# Patient Record
Sex: MALE | Race: WHITE | NOT HISPANIC OR LATINO | URBAN - METROPOLITAN AREA
[De-identification: names, ages, dates, MRNs, and addresses within clinical notes are randomized per-mention and may not be internally consistent; named-entity substitution may affect disease eponyms.]

---

## 2022-09-20 ENCOUNTER — APPOINTMENT (RX ONLY)
Dept: URBAN - METROPOLITAN AREA CLINIC 187 | Facility: CLINIC | Age: 52
Setting detail: DERMATOLOGY
End: 2022-09-20

## 2022-09-20 DIAGNOSIS — L81.4 OTHER MELANIN HYPERPIGMENTATION: ICD-10-CM | Status: UNCHANGED

## 2022-09-20 DIAGNOSIS — L663 OTHER SPECIFIED DISEASES OF HAIR AND HAIR FOLLICLES: ICD-10-CM

## 2022-09-20 DIAGNOSIS — L85.3 XEROSIS CUTIS: ICD-10-CM

## 2022-09-20 DIAGNOSIS — L82.1 OTHER SEBORRHEIC KERATOSIS: ICD-10-CM | Status: UNCHANGED

## 2022-09-20 DIAGNOSIS — D49.2 NEOPLASM OF UNSPECIFIED BEHAVIOR OF BONE, SOFT TISSUE, AND SKIN: ICD-10-CM | Status: UNCHANGED

## 2022-09-20 DIAGNOSIS — L738 OTHER SPECIFIED DISEASES OF HAIR AND HAIR FOLLICLES: ICD-10-CM

## 2022-09-20 DIAGNOSIS — B86 SCABIES: ICD-10-CM

## 2022-09-20 DIAGNOSIS — L73.9 FOLLICULAR DISORDER, UNSPECIFIED: ICD-10-CM

## 2022-09-20 DIAGNOSIS — L57.8 OTHER SKIN CHANGES DUE TO CHRONIC EXPOSURE TO NONIONIZING RADIATION: ICD-10-CM | Status: INADEQUATELY CONTROLLED

## 2022-09-20 DIAGNOSIS — D18.0 HEMANGIOMA: ICD-10-CM | Status: UNCHANGED

## 2022-09-20 PROBLEM — L02.821 FURUNCLE OF HEAD [ANY PART, EXCEPT FACE]: Status: ACTIVE | Noted: 2022-09-20

## 2022-09-20 PROBLEM — D18.01 HEMANGIOMA OF SKIN AND SUBCUTANEOUS TISSUE: Status: ACTIVE | Noted: 2022-09-20

## 2022-09-20 PROCEDURE — ? ADDITIONAL NOTES

## 2022-09-20 PROCEDURE — ? EDUCATIONAL RESOURCES PROVIDED

## 2022-09-20 PROCEDURE — 11102 TANGNTL BX SKIN SINGLE LES: CPT

## 2022-09-20 PROCEDURE — ? BIOPSY BY SHAVE METHOD

## 2022-09-20 PROCEDURE — ? COUNSELING

## 2022-09-20 PROCEDURE — ? SUNSCREEN RECOMMENDATIONS

## 2022-09-20 PROCEDURE — ? PRESCRIPTION

## 2022-09-20 PROCEDURE — 99214 OFFICE O/P EST MOD 30 MIN: CPT | Mod: 25

## 2022-09-20 PROCEDURE — ? OTC TREATMENT REGIMEN

## 2022-09-20 RX ORDER — IVERMECTIN 3 MG/1
TABLET ORAL
Qty: 8 | Refills: 0 | Status: ERX | COMMUNITY
Start: 2022-09-20

## 2022-09-20 RX ORDER — CLINDAMYCIN PHOSPHATE 10 MG/ML
SOLUTION TOPICAL
Qty: 60 | Refills: 1 | Status: ERX | COMMUNITY
Start: 2022-09-20

## 2022-09-20 RX ORDER — TRIAMCINOLONE ACETONIDE 1 MG/G
CREAM TOPICAL
Qty: 453.6 | Refills: 0 | Status: ERX | COMMUNITY
Start: 2022-09-20

## 2022-09-20 RX ADMIN — CLINDAMYCIN PHOSPHATE 1: 10 SOLUTION TOPICAL at 00:00

## 2022-09-20 RX ADMIN — IVERMECTIN 1: 3 TABLET ORAL at 00:00

## 2022-09-20 RX ADMIN — TRIAMCINOLONE ACETONIDE 1: 1 CREAM TOPICAL at 00:00

## 2022-09-20 ASSESSMENT — LOCATION DETAILED DESCRIPTION DERM
LOCATION DETAILED: STERNAL NOTCH
LOCATION DETAILED: RIGHT SUPERIOR UPPER BACK
LOCATION DETAILED: LEFT ANTERIOR DISTAL THIGH
LOCATION DETAILED: RIGHT DISTAL PRETIBIAL REGION
LOCATION DETAILED: RIGHT ANTERIOR PROXIMAL THIGH
LOCATION DETAILED: RIGHT MID-UPPER BACK
LOCATION DETAILED: LEFT MEDIAL UPPER BACK
LOCATION DETAILED: LEFT SUPERIOR PARIETAL SCALP
LOCATION DETAILED: RIGHT ANTERIOR DISTAL THIGH
LOCATION DETAILED: LEFT PROXIMAL POSTERIOR UPPER ARM
LOCATION DETAILED: RIGHT SUPERIOR OCCIPITAL SCALP
LOCATION DETAILED: RIGHT PROXIMAL PRETIBIAL REGION
LOCATION DETAILED: LEFT CENTRAL FRONTAL SCALP
LOCATION DETAILED: RIGHT PROXIMAL CALF
LOCATION DETAILED: LEFT SUPERIOR MEDIAL UPPER BACK
LOCATION DETAILED: RIGHT MEDIAL SUPERIOR CHEST
LOCATION DETAILED: LEFT DISTAL CALF
LOCATION DETAILED: RIGHT ELBOW
LOCATION DETAILED: RIGHT OCCIPITAL SCALP
LOCATION DETAILED: LEFT PROXIMAL PRETIBIAL REGION
LOCATION DETAILED: RIGHT POPLITEAL SKIN
LOCATION DETAILED: LEFT VENTRAL DISTAL FOREARM
LOCATION DETAILED: LEFT SUPRAPUBIC REGION
LOCATION DETAILED: RIGHT VENTRAL PROXIMAL FOREARM
LOCATION DETAILED: LEFT ANTERIOR SHOULDER
LOCATION DETAILED: RIGHT DORSAL WRIST
LOCATION DETAILED: LEFT SUPERIOR POSTAURICULAR SKIN
LOCATION DETAILED: LEFT DISTAL POSTERIOR UPPER ARM
LOCATION DETAILED: LEFT POPLITEAL SKIN
LOCATION DETAILED: RIGHT PROXIMAL POSTERIOR UPPER ARM
LOCATION DETAILED: RIGHT POSTERIOR SHOULDER
LOCATION DETAILED: LEFT ANTERIOR PROXIMAL UPPER ARM
LOCATION DETAILED: RIGHT DISTAL DORSAL FOREARM
LOCATION DETAILED: LEFT SUPERIOR OCCIPITAL SCALP
LOCATION DETAILED: LEFT PROXIMAL CALF
LOCATION DETAILED: LEFT INFERIOR OCCIPITAL SCALP
LOCATION DETAILED: RIGHT CENTRAL FRONTAL SCALP
LOCATION DETAILED: LEFT DISTAL PRETIBIAL REGION
LOCATION DETAILED: RIGHT VENTRAL DISTAL FOREARM
LOCATION DETAILED: RIGHT KNEE
LOCATION DETAILED: RIGHT PROXIMAL DORSAL FOREARM
LOCATION DETAILED: LEFT SUPERIOR LATERAL LOWER BACK
LOCATION DETAILED: RIGHT LATERAL ABDOMEN
LOCATION DETAILED: LEFT SUPERIOR LATERAL UPPER BACK
LOCATION DETAILED: LEFT ANTERIOR PROXIMAL THIGH
LOCATION DETAILED: PERIUMBILICAL SKIN
LOCATION DETAILED: LEFT LATERAL ABDOMEN
LOCATION DETAILED: LEFT SUPERIOR UPPER BACK
LOCATION DETAILED: RIGHT SUPERIOR MEDIAL LOWER BACK
LOCATION DETAILED: LEFT DISTAL DORSAL FOREARM
LOCATION DETAILED: RIGHT INFERIOR OCCIPITAL SCALP
LOCATION DETAILED: LEFT SUPERIOR MEDIAL MIDBACK
LOCATION DETAILED: LEFT OCCIPITAL SCALP
LOCATION DETAILED: EPIGASTRIC SKIN
LOCATION DETAILED: LEFT PROXIMAL DORSAL FOREARM
LOCATION DETAILED: LEFT MID-UPPER BACK
LOCATION DETAILED: LEFT POSTERIOR SHOULDER
LOCATION DETAILED: RIGHT ANTERIOR SHOULDER
LOCATION DETAILED: RIGHT CENTRAL PARIETAL SCALP
LOCATION DETAILED: LEFT CENTRAL PARIETAL SCALP
LOCATION DETAILED: LEFT MEDIAL SUPERIOR CHEST

## 2022-09-20 ASSESSMENT — LOCATION SIMPLE DESCRIPTION DERM
LOCATION SIMPLE: RIGHT SHOULDER
LOCATION SIMPLE: LEFT LOWER BACK
LOCATION SIMPLE: POSTERIOR SCALP
LOCATION SIMPLE: LEFT PRETIBIAL REGION
LOCATION SIMPLE: SCALP
LOCATION SIMPLE: CHEST
LOCATION SIMPLE: RIGHT PRETIBIAL REGION
LOCATION SIMPLE: RIGHT SCALP
LOCATION SIMPLE: LEFT CALF
LOCATION SIMPLE: LEFT UPPER BACK
LOCATION SIMPLE: RIGHT KNEE
LOCATION SIMPLE: LEFT THIGH
LOCATION SIMPLE: LEFT FOREARM
LOCATION SIMPLE: RIGHT UPPER BACK
LOCATION SIMPLE: RIGHT THIGH
LOCATION SIMPLE: LEFT SUPRAPUBIC REGION
LOCATION SIMPLE: LEFT POSTERIOR UPPER ARM
LOCATION SIMPLE: RIGHT POPLITEAL SKIN
LOCATION SIMPLE: ABDOMEN
LOCATION SIMPLE: LEFT SCALP
LOCATION SIMPLE: RIGHT FOREARM
LOCATION SIMPLE: LEFT POPLITEAL SKIN
LOCATION SIMPLE: RIGHT OCCIPITAL SCALP
LOCATION SIMPLE: RIGHT UPPER ARM
LOCATION SIMPLE: RIGHT LOWER BACK
LOCATION SIMPLE: LEFT UPPER ARM
LOCATION SIMPLE: RIGHT ELBOW
LOCATION SIMPLE: RIGHT WRIST
LOCATION SIMPLE: LEFT OCCIPITAL SCALP
LOCATION SIMPLE: LEFT SHOULDER
LOCATION SIMPLE: RIGHT CALF

## 2022-09-20 ASSESSMENT — LOCATION ZONE DERM
LOCATION ZONE: TRUNK
LOCATION ZONE: LEG
LOCATION ZONE: SCALP
LOCATION ZONE: ARM

## 2022-09-20 NOTE — PROCEDURE: BIOPSY BY SHAVE METHOD
Detail Level: Detailed
Depth Of Biopsy: dermis
Was A Bandage Applied: Yes
Size Of Lesion In Cm: 0
Biopsy Type: H and E
Biopsy Method: Dermablade
Anesthesia Type: 1% lidocaine with epinephrine
Anesthesia Volume In Cc (Will Not Render If 0): 0.5
Hemostasis: Monsel's and Electrocautery
Wound Care: Vaseline
Dressing: bandage
Destruction After The Procedure: No
Type Of Destruction Used: Curettage
Curettage Text: The wound bed was treated with curettage after the biopsy was performed.
Cryotherapy Text: The wound bed was treated with cryotherapy after the biopsy was performed.
Electrodesiccation Text: The wound bed was treated with electrodesiccation after the biopsy was performed.
Electrodesiccation And Curettage Text: The wound bed was treated with electrodesiccation and curettage after the biopsy was performed.
Silver Nitrate Text: The wound bed was treated with silver nitrate after the biopsy was performed.
Lab: -[id number]
Consent: Written consent was obtained and risks were reviewed including but not limited to scarring, infection, bleeding, scabbing, incomplete removal, nerve damage and allergy to anesthesia.
Post-Care Instructions: I reviewed with the patient in detail post-care instructions. Patient is to keep the biopsy site dry overnight, and then apply bacitracin twice daily until healed. Patient may apply hydrogen peroxide soaks to remove any crusting.
Notification Instructions: Patient will be notified of biopsy results. However, patient instructed to call the office if not contacted within 2 weeks.
Billing Type: [employer]
Information: Selecting Yes will display possible errors in your note based on the variables you have selected. This validation is only offered as a suggestion for you. PLEASE NOTE THAT THE VALIDATION TEXT WILL BE REMOVED WHEN YOU FINALIZE YOUR NOTE. IF YOU WANT TO FAX A PRELIMINARY NOTE YOU WILL NEED TO TOGGLE THIS TO 'NO' IF YOU DO NOT WANT IT IN YOUR FAXED NOTE.

## 2022-09-20 NOTE — PROCEDURE: ADDITIONAL NOTES
Additional Notes: Providers decision of not taking photo of groin area.
Detail Level: Zone
Render Risk Assessment In Note?: yes

## 2022-09-20 NOTE — PROCEDURE: MIPS QUALITY
Detail Level: Detailed
Quality 265: Biopsy Follow-Up: Biopsy results reviewed, communicated, tracked, and documented
Quality 110: Preventive Care And Screening: Influenza Immunization: Influenza Immunization Administered during Influenza season
Quality 111:Pneumonia Vaccination Status For Older Adults: Pneumococcal vaccine (PPSV23) administered on or after patientâs 60th birthday and before the end of the measurement period
Quality 431: Preventive Care And Screening: Unhealthy Alcohol Use - Screening: Patient not identified as an unhealthy alcohol user when screened for unhealthy alcohol use using a systematic screening method
Quality 130: Documentation Of Current Medications In The Medical Record: Current Medications Documented
Quality 226: Preventive Care And Screening: Tobacco Use: Screening And Cessation Intervention: Patient screened for tobacco use and is an ex/non-smoker
Quality 402: Tobacco Use And Help With Quitting Among Adolescents: Patient screened for tobacco and never smoked

## 2022-10-06 ENCOUNTER — APPOINTMENT (RX ONLY)
Dept: URBAN - METROPOLITAN AREA CLINIC 187 | Facility: CLINIC | Age: 52
Setting detail: DERMATOLOGY
End: 2022-10-06

## 2022-10-06 DIAGNOSIS — D49.2 NEOPLASM OF UNSPECIFIED BEHAVIOR OF BONE, SOFT TISSUE, AND SKIN: ICD-10-CM

## 2022-10-06 DIAGNOSIS — R21 RASH AND OTHER NONSPECIFIC SKIN ERUPTION: ICD-10-CM | Status: INADEQUATELY CONTROLLED

## 2022-10-06 PROCEDURE — ? PRESCRIPTION MEDICATION MANAGEMENT

## 2022-10-06 PROCEDURE — 11104 PUNCH BX SKIN SINGLE LESION: CPT

## 2022-10-06 PROCEDURE — ? COUNSELING

## 2022-10-06 PROCEDURE — 99214 OFFICE O/P EST MOD 30 MIN: CPT | Mod: 25

## 2022-10-06 PROCEDURE — ? PRESCRIPTION

## 2022-10-06 PROCEDURE — ? ADDITIONAL NOTES

## 2022-10-06 PROCEDURE — ? BIOPSY BY PUNCH METHOD

## 2022-10-06 RX ORDER — HYDROXYZINE HYDROCHLORIDE 10 MG/1
TABLET, FILM COATED ORAL
Qty: 14 | Refills: 0 | Status: ERX | COMMUNITY
Start: 2022-10-06

## 2022-10-06 RX ORDER — PREDNISONE 10 MG/1
TABLET ORAL
Qty: 30 | Refills: 0 | Status: ERX | COMMUNITY
Start: 2022-10-06

## 2022-10-06 RX ADMIN — PREDNISONE: 10 TABLET ORAL at 00:00

## 2022-10-06 RX ADMIN — HYDROXYZINE HYDROCHLORIDE: 10 TABLET, FILM COATED ORAL at 00:00

## 2022-10-06 ASSESSMENT — LOCATION SIMPLE DESCRIPTION DERM
LOCATION SIMPLE: RIGHT POSTERIOR THIGH
LOCATION SIMPLE: RIGHT THIGH
LOCATION SIMPLE: LEFT POPLITEAL SKIN
LOCATION SIMPLE: RIGHT PRETIBIAL REGION
LOCATION SIMPLE: RIGHT THIGH
LOCATION SIMPLE: RIGHT CALF
LOCATION SIMPLE: LEFT PRETIBIAL REGION
LOCATION SIMPLE: LEFT CALF
LOCATION SIMPLE: LEFT POPLITEAL SKIN
LOCATION SIMPLE: LEFT LOWER BACK

## 2022-10-06 ASSESSMENT — LOCATION DETAILED DESCRIPTION DERM
LOCATION DETAILED: LEFT POPLITEAL SKIN
LOCATION DETAILED: RIGHT ANTERIOR DISTAL THIGH
LOCATION DETAILED: LEFT PROXIMAL PRETIBIAL REGION
LOCATION DETAILED: RIGHT PROXIMAL PRETIBIAL REGION
LOCATION DETAILED: LEFT PROXIMAL CALF
LOCATION DETAILED: RIGHT PROXIMAL CALF
LOCATION DETAILED: RIGHT ANTERIOR DISTAL THIGH
LOCATION DETAILED: LEFT INFERIOR LATERAL MIDBACK
LOCATION DETAILED: LEFT POPLITEAL SKIN
LOCATION DETAILED: RIGHT DISTAL POSTERIOR THIGH

## 2022-10-06 ASSESSMENT — LOCATION ZONE DERM
LOCATION ZONE: TRUNK
LOCATION ZONE: LEG
LOCATION ZONE: LEG

## 2022-10-06 ASSESSMENT — SEVERITY ASSESSMENT: SEVERITY: MILD TO MODERATE

## 2022-10-06 ASSESSMENT — BSA RASH: BSA RASH: 19

## 2022-10-06 NOTE — PROCEDURE: PRESCRIPTION MEDICATION MANAGEMENT
Render In Strict Bullet Format?: Yes
Detail Level: Zone
Initiate Treatment: ERX) prednisone 10 mg tablet \\nQuantity: 30.0 Tablet\\nSig: Take 4 tablets daily for 4 days, 3 tablets daily for 3 days, 2 tablets daily for 2 days, 1 tablet daily for 1 day\\n\\nERX) hydroxyzine HCl 10 mg tablet \\nQuantity: 14.0 Tablet\\nSig: Take one by mouth one hour before bed

## 2022-10-06 NOTE — HPI: OTHER
Condition:: rash
Please Describe Your Condition:: new itchy pink papules that have started to appear after scabies treatment

## 2022-10-06 NOTE — PROCEDURE: COUNSELING
Detail Level: Detailed
Antihistamine Recommendations: OTC zyrtec, claritin, allegra for daytime\\n\\nBenadryl for nightime

## 2022-10-06 NOTE — PROCEDURE: ADDITIONAL NOTES
Render Risk Assessment In Note?: yes
Detail Level: Zone
Additional Notes: Patient will have wife remove sutures at home

## 2022-10-06 NOTE — PROCEDURE: BIOPSY BY PUNCH METHOD
Body Location Override (Optional - Billing Will Still Be Based On Selected Body Map Location If Applicable): left lateral abdomen
Detail Level: Detailed
Was A Bandage Applied: Yes
Punch Size In Mm: 2.5
Biopsy Type: H and E
Anesthesia Type: 1% lidocaine with epinephrine
Anesthesia Volume In Cc (Will Not Render If 0): 6
Additional Anesthesia Volume In Cc (Will Not Render If 0): 0
Hemostasis: Monsel's and pinpoint electrocautery
Epidermal Sutures: 4-0 Ethilon
Number Of Epidermal Sutures (Optional): 2
Wound Care: Petrolatum
Dressing: pressure dressing with telfa
Suture Removal: 7 days
Patient Will Remove Sutures At Home?: No
Lab: -[id number]
Consent: Written consent was obtained and risks were reviewed including but not limited to scarring, infection, bleeding, scabbing, incomplete removal, nerve damage and allergy to anesthesia.
Post-Care Instructions: I reviewed with the patient in detail post-care instructions. Patient is to keep the biopsy site dry overnight, and then apply bacitracin twice daily until healed. Patient may apply hydrogen peroxide soaks to remove any crusting.
Home Suture Removal Text: Patient was provided a home suture removal kit and will remove their sutures at home. If they have any questions or difficulties they will call the office.
Notification Instructions: Patient will be notified of biopsy results. However, patient instructed to call the office if not contacted within 2 weeks.
Billing Type: [employer]
Information: Selecting Yes will display possible errors in your note based on the variables you have selected. This validation is only offered as a suggestion for you. PLEASE NOTE THAT THE VALIDATION TEXT WILL BE REMOVED WHEN YOU FINALIZE YOUR NOTE. IF YOU WANT TO FAX A PRELIMINARY NOTE YOU WILL NEED TO TOGGLE THIS TO 'NO' IF YOU DO NOT WANT IT IN YOUR FAXED NOTE.

## 2022-10-06 NOTE — PROCEDURE: MIPS QUALITY
Quality 226: Preventive Care And Screening: Tobacco Use: Screening And Cessation Intervention: Patient screened for tobacco use and is an ex/non-smoker
Quality 130: Documentation Of Current Medications In The Medical Record: Current Medications Documented
Quality 110: Preventive Care And Screening: Influenza Immunization: Influenza Immunization Administered during Influenza season
Detail Level: Detailed
Quality 402: Tobacco Use And Help With Quitting Among Adolescents: Patient screened for tobacco and never smoked
Quality 431: Preventive Care And Screening: Unhealthy Alcohol Use - Screening: Patient not identified as an unhealthy alcohol user when screened for unhealthy alcohol use using a systematic screening method